# Patient Record
Sex: FEMALE | Race: WHITE | URBAN - METROPOLITAN AREA
[De-identification: names, ages, dates, MRNs, and addresses within clinical notes are randomized per-mention and may not be internally consistent; named-entity substitution may affect disease eponyms.]

---

## 2022-02-26 ENCOUNTER — EMERGENCY (EMERGENCY)
Facility: HOSPITAL | Age: 27
LOS: 0 days | Discharge: HOME | End: 2022-02-26
Attending: EMERGENCY MEDICINE | Admitting: EMERGENCY MEDICINE
Payer: SELF-PAY

## 2022-02-26 VITALS — SYSTOLIC BLOOD PRESSURE: 112 MMHG | HEART RATE: 68 BPM | DIASTOLIC BLOOD PRESSURE: 68 MMHG

## 2022-02-26 VITALS
SYSTOLIC BLOOD PRESSURE: 112 MMHG | HEART RATE: 75 BPM | RESPIRATION RATE: 18 BRPM | OXYGEN SATURATION: 100 % | TEMPERATURE: 99 F | DIASTOLIC BLOOD PRESSURE: 75 MMHG

## 2022-02-26 DIAGNOSIS — N93.9 ABNORMAL UTERINE AND VAGINAL BLEEDING, UNSPECIFIED: ICD-10-CM

## 2022-02-26 DIAGNOSIS — O26.891 OTHER SPECIFIED PREGNANCY RELATED CONDITIONS, FIRST TRIMESTER: ICD-10-CM

## 2022-02-26 DIAGNOSIS — O03.9 COMPLETE OR UNSPECIFIED SPONTANEOUS ABORTION WITHOUT COMPLICATION: ICD-10-CM

## 2022-02-26 DIAGNOSIS — O46.91 ANTEPARTUM HEMORRHAGE, UNSPECIFIED, FIRST TRIMESTER: ICD-10-CM

## 2022-02-26 DIAGNOSIS — R10.9 UNSPECIFIED ABDOMINAL PAIN: ICD-10-CM

## 2022-02-26 LAB
ALBUMIN SERPL ELPH-MCNC: 5.1 G/DL — SIGNIFICANT CHANGE UP (ref 3.5–5.2)
ALP SERPL-CCNC: 45 U/L — SIGNIFICANT CHANGE UP (ref 30–115)
ALT FLD-CCNC: 10 U/L — SIGNIFICANT CHANGE UP (ref 0–41)
ANION GAP SERPL CALC-SCNC: 14 MMOL/L — SIGNIFICANT CHANGE UP (ref 7–14)
APPEARANCE UR: ABNORMAL
APTT BLD: 28.5 SEC — SIGNIFICANT CHANGE UP (ref 27–39.2)
AST SERPL-CCNC: 14 U/L — SIGNIFICANT CHANGE UP (ref 0–41)
BACTERIA # UR AUTO: ABNORMAL
BASOPHILS # BLD AUTO: 0.03 K/UL — SIGNIFICANT CHANGE UP (ref 0–0.2)
BASOPHILS NFR BLD AUTO: 0.4 % — SIGNIFICANT CHANGE UP (ref 0–1)
BILIRUB SERPL-MCNC: 0.7 MG/DL — SIGNIFICANT CHANGE UP (ref 0.2–1.2)
BILIRUB UR-MCNC: SIGNIFICANT CHANGE UP
BLD GP AB SCN SERPL QL: SIGNIFICANT CHANGE UP
BUN SERPL-MCNC: 8 MG/DL — LOW (ref 10–20)
CALCIUM SERPL-MCNC: 9.7 MG/DL — SIGNIFICANT CHANGE UP (ref 8.5–10.1)
CHLORIDE SERPL-SCNC: 103 MMOL/L — SIGNIFICANT CHANGE UP (ref 98–110)
CO2 SERPL-SCNC: 23 MMOL/L — SIGNIFICANT CHANGE UP (ref 17–32)
COLOR SPEC: ABNORMAL
CREAT SERPL-MCNC: 0.6 MG/DL — LOW (ref 0.7–1.5)
DIFF PNL FLD: SIGNIFICANT CHANGE UP
EOSINOPHIL # BLD AUTO: 0.15 K/UL — SIGNIFICANT CHANGE UP (ref 0–0.7)
EOSINOPHIL NFR BLD AUTO: 2 % — SIGNIFICANT CHANGE UP (ref 0–8)
EPI CELLS # UR: 8 /HPF — HIGH (ref 0–5)
GLUCOSE SERPL-MCNC: 86 MG/DL — SIGNIFICANT CHANGE UP (ref 70–99)
GLUCOSE UR QL: SIGNIFICANT CHANGE UP
HCG SERPL QL: POSITIVE — SIGNIFICANT CHANGE UP
HCG SERPL-ACNC: HIGH MIU/ML
HCT VFR BLD CALC: 41 % — SIGNIFICANT CHANGE UP (ref 37–47)
HGB BLD-MCNC: 14.2 G/DL — SIGNIFICANT CHANGE UP (ref 12–16)
HYALINE CASTS # UR AUTO: 7 /LPF — SIGNIFICANT CHANGE UP (ref 0–7)
IMM GRANULOCYTES NFR BLD AUTO: 0.5 % — HIGH (ref 0.1–0.3)
INR BLD: 1.18 RATIO — SIGNIFICANT CHANGE UP (ref 0.65–1.3)
KETONES UR-MCNC: SIGNIFICANT CHANGE UP
LEUKOCYTE ESTERASE UR-ACNC: SIGNIFICANT CHANGE UP
LYMPHOCYTES # BLD AUTO: 1.96 K/UL — SIGNIFICANT CHANGE UP (ref 1.2–3.4)
LYMPHOCYTES # BLD AUTO: 26.1 % — SIGNIFICANT CHANGE UP (ref 20.5–51.1)
MCHC RBC-ENTMCNC: 29.5 PG — SIGNIFICANT CHANGE UP (ref 27–31)
MCHC RBC-ENTMCNC: 34.6 G/DL — SIGNIFICANT CHANGE UP (ref 32–37)
MCV RBC AUTO: 85.1 FL — SIGNIFICANT CHANGE UP (ref 81–99)
MONOCYTES # BLD AUTO: 0.53 K/UL — SIGNIFICANT CHANGE UP (ref 0.1–0.6)
MONOCYTES NFR BLD AUTO: 7.1 % — SIGNIFICANT CHANGE UP (ref 1.7–9.3)
NEUTROPHILS # BLD AUTO: 4.8 K/UL — SIGNIFICANT CHANGE UP (ref 1.4–6.5)
NEUTROPHILS NFR BLD AUTO: 63.9 % — SIGNIFICANT CHANGE UP (ref 42.2–75.2)
NITRITE UR-MCNC: SIGNIFICANT CHANGE UP
NRBC # BLD: 0 /100 WBCS — SIGNIFICANT CHANGE UP (ref 0–0)
PH UR: SIGNIFICANT CHANGE UP (ref 5–8)
PLATELET # BLD AUTO: 186 K/UL — SIGNIFICANT CHANGE UP (ref 130–400)
POTASSIUM SERPL-MCNC: 3.8 MMOL/L — SIGNIFICANT CHANGE UP (ref 3.5–5)
POTASSIUM SERPL-SCNC: 3.8 MMOL/L — SIGNIFICANT CHANGE UP (ref 3.5–5)
PROT SERPL-MCNC: 7.3 G/DL — SIGNIFICANT CHANGE UP (ref 6–8)
PROT UR-MCNC: SIGNIFICANT CHANGE UP
PROTHROM AB SERPL-ACNC: 13.6 SEC — HIGH (ref 9.95–12.87)
RBC # BLD: 4.82 M/UL — SIGNIFICANT CHANGE UP (ref 4.2–5.4)
RBC # FLD: 11.9 % — SIGNIFICANT CHANGE UP (ref 11.5–14.5)
RBC CASTS # UR COMP ASSIST: >720 /HPF — HIGH (ref 0–4)
SARS-COV-2 RNA SPEC QL NAA+PROBE: SIGNIFICANT CHANGE UP
SODIUM SERPL-SCNC: 140 MMOL/L — SIGNIFICANT CHANGE UP (ref 135–146)
SP GR SPEC: 1.02 — SIGNIFICANT CHANGE UP (ref 1.01–1.03)
UROBILINOGEN FLD QL: SIGNIFICANT CHANGE UP
WBC # BLD: 7.51 K/UL — SIGNIFICANT CHANGE UP (ref 4.8–10.8)
WBC # FLD AUTO: 7.51 K/UL — SIGNIFICANT CHANGE UP (ref 4.8–10.8)
WBC UR QL: 6 /HPF — HIGH (ref 0–5)

## 2022-02-26 PROCEDURE — 99285 EMERGENCY DEPT VISIT HI MDM: CPT

## 2022-02-26 PROCEDURE — 76830 TRANSVAGINAL US NON-OB: CPT | Mod: 26

## 2022-02-26 PROCEDURE — 88304 TISSUE EXAM BY PATHOLOGIST: CPT | Mod: 26

## 2022-02-26 RX ORDER — ACETAMINOPHEN 500 MG
650 TABLET ORAL ONCE
Refills: 0 | Status: COMPLETED | OUTPATIENT
Start: 2022-02-26 | End: 2022-02-26

## 2022-02-26 RX ORDER — SODIUM CHLORIDE 9 MG/ML
1000 INJECTION, SOLUTION INTRAVENOUS ONCE
Refills: 0 | Status: COMPLETED | OUTPATIENT
Start: 2022-02-26 | End: 2022-02-26

## 2022-02-26 RX ADMIN — SODIUM CHLORIDE 1000 MILLILITER(S): 9 INJECTION, SOLUTION INTRAVENOUS at 17:16

## 2022-02-26 RX ADMIN — SODIUM CHLORIDE 1000 MILLILITER(S): 9 INJECTION, SOLUTION INTRAVENOUS at 15:37

## 2022-02-26 RX ADMIN — Medication 650 MILLIGRAM(S): at 15:37

## 2022-02-26 NOTE — ED ADULT TRIAGE NOTE - CHIEF COMPLAINT QUOTE
right sided groin with vag bleed for the past 3 hours 5 weeks pregnant and seen at [facility] yesterday. possible ectopic pregnancy and told to see specialist.

## 2022-02-26 NOTE — ED PROVIDER NOTE - NSFOLLOWUPCLINICS_GEN_ALL_ED_FT
[facility]  OB/GYN  [address] [place] [address]  Phone: [phone]  Fax:   Follow Up Time: 1-3 Days

## 2022-02-26 NOTE — ED PROVIDER NOTE - ATTENDING CONTRIBUTION TO CARE
27 yo F pmh of asthma presents for pregnancy evaluation. Patient currently 5 weeks pregnant, LMP dec 29th. First pregnancy. Reports bleeding that started 2 days ago was light. [facility], unclear if they were able to visualize IUP. Was told she may have a possible ectopic. Patient states that now she is having worsening bleeding with clots, reports needing to use 3 pads in 1 hour. Also having worsening cramping. no urinary symptoms. does not follow with obgyn.     CONSTITUTIONAL: Well-developed; well-nourished; in no acute distress.   SKIN: warm, dry  HEAD: Normocephalic; atraumatic.  EYES: PERRL, EOMI, no conjunctival erythema  ENT: No nasal discharge; airway clear.  NECK: Supple; non tender.  CARD: S1, S2 normal;  Regular rate and rhythm.   RESP: No wheezes, rales or rhonchi.  ABD: soft + suprapubic tenderness., non distended, no rebound or guarding  EXT: Normal ROM.  5/5 strength in all 4 extremities   LYMPH: No acute cervical adenopathy.  NEURO: Alert, oriented, grossly unremarkable. neurovascularly intact  PSYCH: Cooperative, appropriate.

## 2022-02-26 NOTE — ED PROVIDER NOTE - NS ED ROS FT
Review of Systems:  CONSTITUTIONAL: No fever, No diaphoresis  SKIN: No rash  EYES: No eye pain, No blurred vision  ENT: No change in hearing, No sore throat, No neck pain, No rhinorrhea  RESPIRATORY: No shortness of breath, No cough  CARDIAC: No chest pain, No palpitations  GI: + abdominal pain, No nausea, No vomiting, No diarrhea, No constipation, No bright red blood per rectum or melena. No flank pain  : No dysuria, frequency, hematuria, + vaginal bleeding  MUSCULOSKELETAL: No joint paint, No swelling, No back pain  NEUROLOGIC: No numbness, No focal weakness, No headache, No dizziness  All other systems negative, unless specified in HPI

## 2022-02-26 NOTE — ED ADULT NURSE NOTE - OBJECTIVE STATEMENT
PT c/o vaginal bleeding and lower abd pain x2 days, Pt states she had increased bleeding today going through 3-4 pads in 20min. [facility] and was referred to go a specialist.

## 2022-02-26 NOTE — ED PROVIDER NOTE - PATIENT PORTAL LINK FT
You can access the FollowMyHealth Patient Portal offered by [facility] by registering at the following website: [email or web address]. By joining [employer]’s FollowMyHealth portal, you will also be able to view your health information using other applications (apps) compatible with our system.

## 2022-02-26 NOTE — ED ADULT NURSE NOTE - CHIEF COMPLAINT QUOTE
right sided groin with vag bleed for the past 3 hours 5 weeks pregnant and seen at [facility] yesterday. possible ectopic pregnancy and told to see specialist.

## 2022-02-26 NOTE — ED PROVIDER NOTE - NSFOLLOWUPINSTRUCTIONS_ED_ALL_ED_FT
- Please follow up with the OB doctors  - They will call you with the results  - Keep an eye out for infection        Miscarriage    A miscarriage is the sudden loss of an unborn baby (fetus) before the 20th week of pregnancy. Most miscarriages happen in the first 3 months of pregnancy. Sometimes, it happens before a woman even knows she is pregnant. A miscarriage is also called a "spontaneous miscarriage" or "early pregnancy loss." Having a miscarriage can be an emotional experience. Talk with your caregiver about any questions you may have about miscarrying, the grieving process, and your future pregnancy plans.    CAUSES  Problems with the fetal chromosomes that make it impossible for the baby to develop normally. Problems with the baby's genes or chromosomes are most often the result of errors that occur, by chance, as the embryo divides and grows. The problems are not inherited from the parents.  Infection of the cervix or uterus.    Hormone problems.     Problems with the cervix, such as having an incompetent cervix. This is when the tissue in the cervix is not strong enough to hold the pregnancy.    Problems with the uterus, such as an abnormally shaped uterus, uterine fibroids, or congenital abnormalities.    Certain medical conditions.    Smoking, drinking alcohol, or taking illegal drugs.    Trauma.       Often, the cause of a miscarriage is unknown.     SYMPTOMS  Vaginal bleeding or spotting, with or without cramps or pain.  Pain or cramping in the abdomen or lower back.  Passing fluid, tissue, or blood clots from the vagina.     DIAGNOSIS  Your caregiver will perform a physical exam. You may also have an ultrasound to confirm the miscarriage. Blood or urine tests may also be ordered.    TREATMENT  Sometimes, treatment is not necessary if you naturally pass all the fetal tissue that was in the uterus. If some of the fetus or placenta remains in the body (incomplete miscarriage), tissue left behind may become infected and must be removed. Usually, a dilation and curettage (D and C) procedure is performed. During a D and C procedure, the cervix is widened (dilated) and any remaining fetal or placental tissue is gently removed from the uterus.   Antibiotic medicines are prescribed if there is an infection. Other medicines may be given to reduce the size of the uterus (contract) if there is a lot of bleeding.   If you have Rh negative blood and your baby was Rh positive, you will need a Rh immunoglobulin shot. This shot will protect any future baby from having Rh blood problems in future pregnancies.     HOME CARE INSTRUCTIONS  Your caregiver may order bed rest or may allow you to continue light activity. Resume activity as directed by your caregiver.   Have someone help with home and family responsibilities during this time.    Keep track of the number of sanitary pads you use each day and how soaked (saturated) they are. Write down this information.    Do not use tampons. Do not douche or have sexual intercourse until approved by your caregiver.    Only take over-the-counter or prescription medicines for pain or discomfort as directed by your caregiver.    Do not take aspirin. Aspirin can cause bleeding.    Keep all follow-up appointments with your caregiver.    If you or your partner have problems with grieving, talk to your caregiver or seek counseling to help cope with the pregnancy loss. Allow enough time to grieve before trying to get pregnant again.       SEEK IMMEDIATE MEDICAL CARE IF:  You have severe cramps or pain in your back or abdomen.  You have a fever.  You pass large blood clots (walnut-sized or larger) or tissue from your vagina. Save any tissue for your caregiver to inspect.     Your bleeding increases.    You have a thick, bad-smelling vaginal discharge.  You become lightheaded, weak, or you faint.    You have chills.      MAKE SURE YOU:  Understand these instructions.  Will watch your condition.  Will get help right away if you are not doing well or get worse.    ADDITIONAL NOTES AND INSTRUCTIONS    Please follow up with your Primary MD in 24-48 hr.  Seek immediate medical care for any new/worsening signs or symptoms.

## 2022-02-26 NOTE — ED PROVIDER NOTE - OBJECTIVE STATEMENT
27 yo F with PMH Asthma,  who is 5 weeks pregnant who presents with 2 day hx of vaginal bleeding.  LMP 22.  [facility] and told to come to ER.  Pt states vaginal bleeding is worse today with clots.  Pt has not yet followed with OB.  Pt denies fevers, chills, chest pain, SOB, rash, back pain, dysuria, hematuria. 27 yo F with PMH Asthma,  who is reportedly 5 weeks pregnant who presents with 2 day hx of vaginal bleeding.  LMP 22, about 8 weeks ago.  [facility] and told to come to ER.  Pt states vaginal bleeding is worse today with clots.  Pt has not yet followed with OB.  Pt denies fevers, chills, chest pain, SOB, rash, back pain, dysuria, hematuria.

## 2022-02-26 NOTE — CONSULT NOTE ADULT - ASSESSMENT
27yo  @8w4d by LMP on 21, presented with vaginal bleeding and cramping, with findings consistent with complete  s/p bedside ultrasound after passage of tissue on exam, currently clinically and hemodynamically stable    - f/u pathology of uterine contents  - bleeding and ectopic precautions discussed  - tylenol, motrin prn   - f/u at [facility] outpatient   - Dispo per ED     and  aware

## 2022-02-26 NOTE — ED PROVIDER NOTE - PHYSICAL EXAMINATION
CONSTITUTIONAL: in mild acute distress, afebrile  SKIN: Warm, dry  EYES: No conjunctival injection. EOMI  ENT: No nasal discharge; oropharynx nonerythematous; airway clear  ABD: Soft non distended, mild suprapubic ttp, bedside US showing gest sac without obvious IUP; No guarding or rebound tenderness  EXT: Normal ROM.  No clubbing or cyanosis.  No edema, no calf tenderness  NEURO: A&O x3, grossly unremarkable, no focal deficits  PSYCH: Cooperative, appropriate  *Chaperone MD Berkowitz was used during the encounter. A professional environment was maintained and discussed with patient

## 2022-02-26 NOTE — CONSULT NOTE ADULT - SUBJECTIVE AND OBJECTIVE BOX
OBYGN PGY2    Chief Complaint: vaginal bleeding, cramping    HPI:   25yo  @8w4d by LMP on 21, presented for heavy vaginal bleeding and severe cramping this AM. Patient reports light bleeding past 2 days then this morning she woke up with clothes and sheets soaked in blood and she was using up to 3 pads in 20 minutes at one point. Patient reported passing large clot in the toilet measuring larger than her hand. Cramping was also severe earlier this morning but has improved since in the ED to a 6 out of 10. Had syncopal episode at home, in the ambulance and in the ED bathroom. Patient reported h/o syncopal episodes several times in the past outside of pregnancy due to stress and hyperventilation. This is an unplanned but desired pregnancy. Patient has not seen OBGYN yet for this pregnancy. Had positive Upreg within first week of February. Denies current light headedness, dizziness, palpitations, SOB. Denies fever, chills, nausea, vomiting.     Ob/Gyn History:                   LMP - 21                  Cycle Length - regular, 28day cycle  eTOPx1 @approximately 16w with D&E (at age 19yo)  Denies history of ovarian cysts, uterine fibroids, abnormal paps, or STIs    Denies the following: constitutional symptoms, visual symptoms, cardiovascular symptoms, respiratory symptoms, GI symptoms, musculoskeletal symptoms, skin symptoms, neurologic symptoms, hematologic symptoms, allergic symptoms, psychiatric symptoms  Except any pertinent positives listed.     Home Medications:  albuterol prn    Allergies:  No Known Allergies    PAST MEDICAL & SURGICAL HISTORY:  asthma  D&E for ETOP    FAMILY HISTORY:  DM (mother)    SOCIAL HISTORY: Denies cigarette use, alcohol use, or illicit drug use    Vital Signs Last 24 Hrs  T(F): 98.6 (2022 14:52), Max: 98.6 (2022 14:52)  HR: 68 (2022 16:48) (68 - 75)  BP: 112/68 (2022 16:48) (112/68 - 112/75)  RR: 18 (2022 14:52) (18 - 18)    General Appearance - AAOx3, NAD  Heart - S1S2 regular rate and rhythm  Lung - CTA Bilaterally  Abdomen - Soft, nontender, nondistended, no rebound, no rigidity, no guarding, bowel sounds present    GYN/Pelvis:  External genitalia: normal  Vagina: approximately 5-10cc blood clots in vagina, on valsalva pieces of tissue/blood clots passed (1x4cm in size), no active bleeding   Cervix: closed, no CMT  Uterus: no fundal tenderness, approx 8wk size   Adnexa: no adnexal tenderness or fullness bilaterally     Bedside sonogram at bedside performed by OBGYN residents after speculum exam: previously visualized gestational sac in lower uterine segment no longer visible, endometrial stripe now 1.6cm without doppler flow, consistent with complete      LABS:                        14.2   7.51  )-----------( 186      ( 2022 15:19 )             41.0     HCG Quantitative, Serum: 86404.0 mIU/mL (22 @ 15:19)    ABO RH Interpretation: O POS [2022 20:31  JGURGES  No historical record of blood group and Rh, requires a second sample for  retype prior to the release of any blood products.  For prenatal, a  second sample on next visit.] (22 @ 18:40)  Antibody Screen: NEG (22 @ 18:40)        140  |  103  |  8<L>  ----------------------------<  86  3.8   |  23  |  0.6<L>    Ca    9.7      2022 15:19    TPro  7.3  /  Alb  5.1  /  TBili  0.7  /  DBili  x   /  AST  14  /  ALT  10  /  AlkPhos  45      PT/INR - ( 2022 18:40 )   PT: 13.60 sec;   INR: 1.18 ratio         PTT - ( 2022 18:40 )  PTT:28.5 sec  Urinalysis Basic - ( 2022 16:40 )    Color: Dark Red / Appearance: Turbid / S.016 / pH: x  Gluc: x / Ketone: TNP  / Bili: TNP / Urobili: TNP   Blood: x / Protein: TNP / Nitrite: TNP   Leuk Esterase: TNP / RBC: >720 /HPF / WBC 6 /HPF   Sq Epi: x / Non Sq Epi: 8 /HPF / Bacteria: Few          RADIOLOGY & ADDITIONAL STUDIES:  < from: US Transvaginal (22 @ 18:29) >    ACC: [id number] EXAM:  US TRANSVAGINAL                          PROCEDURE DATE:  2022          INTERPRETATION:  CLINICAL INFORMATION: Heavy vaginal bleeding with clots   beta-hCG 13,158    LMP: 2021.    COMPARISON: None available.    TECHNIQUE: Endovaginal and transabdominal pelvic sonogram. Color Doppler   was performed.    FINDINGS:    Uterus: 10.1 x 5.6 x 5.8 cm. Heterogenous hyperechoic material noted   within the intrauterine canal. Within the lower uterine segment a 1.7 x   0.9 x 0.9 cm hypoechoic structure is noted. There is no yolk sac or fetal   pole demonstrated.    Endometrium: measures approximately 2 cm..    Right ovary: 3.2 x 1.7 x 2.3. Doppler flow demonstrated to the right   ovary.  Left ovary: 3.0 x 1.8 x 2.3 cm. Corpus luteal cyst noted measuring 2.0 x   1.6 x 1.3 cm.    Fluid: None.    IMPRESSION:    Hypoechoic 1.7 cm structure within the lower uterine segment without   visualized fetal pole or yolk sac. Heterogenous hyperechoic material   noted within the intrauterine canal may represent blood products/retained   products of conception. Findings are concerning for a miscarriage in   progress.    --- End of Report ---          FRANCESCA FISHMAN DO; Resident Radiologist  This document has been electronicallysigned.  MAGALI FABIAN MD; Attending Radiologist  This document has been electronically signed. 2022  7:36PM    < end of copied text >

## 2022-02-26 NOTE — ED PROVIDER NOTE - CLINICAL SUMMARY MEDICAL DECISION MAKING FREE TEXT BOX
patient evaluated for vaginal bleeding with passage of clots. us and bcg with exam consistent with completed miscarriage. patient to fu with OB as outpatient, bleeding precautions were discussed with patient and ectopic pregnancy precautions were also discussed.

## 2022-02-26 NOTE — ED PROVIDER NOTE - PROGRESS NOTE DETAILS
AH - I intend to get bedside transabdominal US AH - no obvious identifiable IUP, OB consulted via Teams; UA unable to be read 2/2 contamination with blood Patient signed out to Dr. Prather pending OB evaluation and reassessment. AH - OB evaluated, likely complete , clots sent for pathology, will call pt with results; pt to f/u outpatient, pt agrees with plan; Patient to be discharged from ED. Any available test results were discussed with patient and/or family. Verbal instructions given, including instructions to return to ED immediately for any new, worsening, or concerning symptoms. Strict return precautions given. Patient endorsed understanding. Written discharge instructions additionally given, including follow-up plan

## 2022-02-27 LAB
CULTURE RESULTS: NO GROWTH — SIGNIFICANT CHANGE UP
SPECIMEN SOURCE: SIGNIFICANT CHANGE UP

## 2022-03-01 LAB — SURGICAL PATHOLOGY STUDY: SIGNIFICANT CHANGE UP
